# Patient Record
Sex: FEMALE | Race: BLACK OR AFRICAN AMERICAN | NOT HISPANIC OR LATINO
[De-identification: names, ages, dates, MRNs, and addresses within clinical notes are randomized per-mention and may not be internally consistent; named-entity substitution may affect disease eponyms.]

---

## 2021-06-02 ENCOUNTER — TRANSCRIPTION ENCOUNTER (OUTPATIENT)
Age: 21
End: 2021-06-02

## 2021-06-02 ENCOUNTER — APPOINTMENT (OUTPATIENT)
Dept: PEDIATRIC ORTHOPEDIC SURGERY | Facility: CLINIC | Age: 21
End: 2021-06-02
Payer: MEDICAID

## 2021-06-02 DIAGNOSIS — R26.9 UNSPECIFIED ABNORMALITIES OF GAIT AND MOBILITY: ICD-10-CM

## 2021-06-02 DIAGNOSIS — M21.70 UNEQUAL LIMB LENGTH (ACQUIRED), UNSPECIFIED SITE: ICD-10-CM

## 2021-06-02 DIAGNOSIS — M25.551 PAIN IN RIGHT HIP: ICD-10-CM

## 2021-06-02 DIAGNOSIS — G80.8 OTHER CEREBRAL PALSY: ICD-10-CM

## 2021-06-02 PROCEDURE — 99204 OFFICE O/P NEW MOD 45 MIN: CPT | Mod: 25

## 2021-06-02 PROCEDURE — 73521 X-RAY EXAM HIPS BI 2 VIEWS: CPT

## 2021-06-02 NOTE — REASON FOR VISIT
[Consultation] : a consultation visit [Patient] : patient [Mother] : mother [FreeTextEntry1] : Cerebral palsy, right hip pain

## 2021-06-02 NOTE — CONSULT LETTER
[Dear  ___] : Dear  [unfilled], [Consult Letter:] : I had the pleasure of evaluating your patient, [unfilled]. [Please see my note below.] : Please see my note below. [Consult Closing:] : Thank you very much for allowing me to participate in the care of this patient.  If you have any questions, please do not hesitate to contact me. [Sincerely,] : Sincerely, [FreeTextEntry3] : Donn Boogie MD\par Pediatric Orthopaedics\par [facility]\par \par [address]  \[place] [address]\par Phone: [phone]\par Fax: [phone]\par

## 2021-06-02 NOTE — HISTORY OF PRESENT ILLNESS
[FreeTextEntry1] : Selma is a 20-year-old female with cerebral palsy who comes with her mother for an orthopedic evaluation of her legs as well as sporadic right hip pain. She had surgery on her feet by me approximately 5 years ago. She has not been seen since. The patient has a concern because when she stands she has to keep her left knee in flexion.

## 2021-06-02 NOTE — DEVELOPMENTAL MILESTONES
[Walk ___ Months] : Walk: [unfilled] months [Verbally] : verbally [Right] : right [FreeTextEntry2] : Yes, CP [FreeTextEntry3] : No

## 2021-06-02 NOTE — PHYSICAL EXAM
[FreeTextEntry1] : Alert, comfortable, very obese, in no apparent distress, well-oriented x3, 19-1/2 year old female. She walks independently and has a flat foot type of gait. Both feet are well aligned. Short stride. Leg length discrepancy with the left leg longer than the right by approximately half an inch. She has a full flexion and extension of the hips, abduction 60° bilaterally. No pain with range of motion of the hips. Full flexion and extension of both knees. Both feet are in neutral in terms of valgus and varus. Passive dorsiflexion of the ankles and the knees in extension 0° on the right, -10° on the left, with knees in flexion and 10° on the right, 0° on the left. Full passive range of motion of the upper extremities. Spine is grossly in the midline.

## 2021-06-02 NOTE — ASSESSMENT
[FreeTextEntry1] : Diagnosis: Spastic diplegia cerebral palsy, leg length discrepancy, abnormal gait, right hip pain.\par \par Selma is a 20-year-old female with the above diagnosis. I think part of the problem as might be coming because of the leg length discrepancies. She is given a prescription for a half an inch inside shoe insert on the right. Followup as needed.  All of the mother's questions were addressed. She understood and agreed with the plan.\par \par This note was generated using Dragon medical dictation software.  A reasonable effort has been made for proofreading its contents, but typos may still remain.  If there are any questions or points of clarification needed please do not hesitate to contact my office.\par

## 2021-06-02 NOTE — DATA REVIEWED
[de-identified] : X-rays of her hips including AP and lateral views are taken today. They are within normal limits.

## 2022-05-11 ENCOUNTER — APPOINTMENT (OUTPATIENT)
Dept: PEDIATRIC ORTHOPEDIC SURGERY | Facility: CLINIC | Age: 22
End: 2022-05-11